# Patient Record
Sex: FEMALE | Race: BLACK OR AFRICAN AMERICAN | NOT HISPANIC OR LATINO
[De-identification: names, ages, dates, MRNs, and addresses within clinical notes are randomized per-mention and may not be internally consistent; named-entity substitution may affect disease eponyms.]

---

## 2021-05-05 ENCOUNTER — NON-APPOINTMENT (OUTPATIENT)
Age: 61
End: 2021-05-05

## 2021-05-05 PROBLEM — Z00.00 ENCOUNTER FOR PREVENTIVE HEALTH EXAMINATION: Status: ACTIVE | Noted: 2021-05-05

## 2021-05-06 ENCOUNTER — APPOINTMENT (OUTPATIENT)
Dept: NEUROLOGY | Facility: CLINIC | Age: 61
End: 2021-05-06
Payer: COMMERCIAL

## 2021-05-06 VITALS
BODY MASS INDEX: 33.97 KG/M2 | HEART RATE: 93 BPM | WEIGHT: 199 LBS | HEIGHT: 64 IN | OXYGEN SATURATION: 97 % | DIASTOLIC BLOOD PRESSURE: 91 MMHG | SYSTOLIC BLOOD PRESSURE: 133 MMHG | TEMPERATURE: 98.1 F | RESPIRATION RATE: 18 BRPM

## 2021-05-06 DIAGNOSIS — H53.19 OTHER SUBJECTIVE VISUAL DISTURBANCES: ICD-10-CM

## 2021-05-06 DIAGNOSIS — R42 DIZZINESS AND GIDDINESS: ICD-10-CM

## 2021-05-06 PROCEDURE — 99204 OFFICE O/P NEW MOD 45 MIN: CPT

## 2021-05-06 PROCEDURE — 99072 ADDL SUPL MATRL&STAF TM PHE: CPT

## 2021-05-06 RX ORDER — MECLIZINE HYDROCHLORIDE 12.5 MG/1
12.5 TABLET ORAL 3 TIMES DAILY
Qty: 90 | Refills: 0 | Status: ACTIVE | COMMUNITY
Start: 2021-05-06 | End: 1900-01-01

## 2021-05-11 ENCOUNTER — APPOINTMENT (OUTPATIENT)
Dept: OTOLARYNGOLOGY | Facility: CLINIC | Age: 61
End: 2021-05-11

## 2021-05-13 ENCOUNTER — APPOINTMENT (OUTPATIENT)
Dept: MRI IMAGING | Facility: CLINIC | Age: 61
End: 2021-05-13

## 2021-06-19 NOTE — PHYSICAL EXAM
[FreeTextEntry1] : veers to the R when she ambulates with her eyes closed \par L>R patellar reflex \par L tremor/ dysmetria \par The patient is alert and oriented x3, naming intact x3, repetition normal, follows three-step commands, and is able to participate fully in the history taking.\par Speech is normal with no evidence of dysarthria.\par Memory is intact: Immediate recall 3 out of 3, short-term 3 out of 3, remote memory intact\par Cranial nerves II through XII intact\par Motor exam: Upper and lower extremities 5 out of 5 power, normal tone. No abnormal movements noted.\par Sensory exam: Intact to light touch and pinprick. Romberg negative.\par Coordination and vestibular exam: Finger to nose L tremor/ dysmetria , no evidence of truncal or appendicular ataxia. No evidence of nystagmus. no vestibular symptoms elicited with head turning during ambulation.\par Gait: veers to the R when she ambulates with her eyes closed \par \par \par Reflexes: L>R patellar reflex \par \par HEENT: Normocephalic atraumatic\par Funduscopic: No disc edema\par Neck supple with no JVD. No evidence of carotid bruit.\par Cardiac: S1-S2 regular rate and rhythm, no murmur noted.\par Chest: Clear to auscultation\par Abdomen: nontender, normal bowel sounds, soft\par Extremity: No edema, normal pulses.\par \par

## 2021-06-19 NOTE — ASSESSMENT
[FreeTextEntry1] : Pt will send us labs from endocrinologist and [facility] dementia/ heart study.\par MRI IAC without contrast (significant family hx of renal disease)\par VT for dizziness\par Will rx meclizine 12.5mg PRN

## 2021-06-19 NOTE — HISTORY OF PRESENT ILLNESS
[FreeTextEntry1] : Pt is 61yo r-handed F with PMH glaucoma, parathyroidectomy, c/o vertigo x5 days. Began 5 days ago in the morning when she woke up- she turned in bed and felt like her body was spinning. Worse when she turned her head to L or if she would bend head down. Persisted all day, associated with extreme nausea all day. Next day nausea resolved but still had dizziness- like standing on a keanu boat, which she has been experiencing since. Has also been having some light sensitivity x 1 month- both triggered by street lamps and computer. Told by her optometrist its small cataract. Was seeing halos of light, ordered special screen for computer. Says she hasn’t really noticed it recently. Has not been having any headaches. \par Denies hx of vertigo, except when she was treated with gabapentin 6 months ago for shingles- caused occipital pain and dizziness, resolved when she stopped taking the gabapentin \par \par Gained 40lbs during covid\par Was being tested for dementia (mom with severe dementia), had some tests and told that one of her cardiac tests (??unknown) was abnormal \par \par Denies hx migraines\par Denies hx of head trauma \par Family hx \par maternal grandmother DM\par mom dementia, glaucoma\par dad HTN, glaucoma\par brother HTN \par Daughter IIH- idiopathic intracranial hypertension, anxiety\par several siblings with glaucoma \par \par Smoking- denies\par ETOH- denies\par recreational drugs- denies \par \par PCP- Jessica Mckee  \par Endocrinologist- Milton Griffin